# Patient Record
Sex: FEMALE | Race: WHITE
[De-identification: names, ages, dates, MRNs, and addresses within clinical notes are randomized per-mention and may not be internally consistent; named-entity substitution may affect disease eponyms.]

---

## 2018-01-31 ENCOUNTER — HOSPITAL ENCOUNTER (OUTPATIENT)
Dept: HOSPITAL 17 - ESDC | Age: 78
Discharge: HOME | End: 2018-01-31
Attending: OPHTHALMOLOGY
Payer: COMMERCIAL

## 2018-01-31 DIAGNOSIS — H35.372: Primary | ICD-10-CM

## 2018-01-31 PROCEDURE — 00145 ANES PX EYE VITREORTNL SURG: CPT

## 2018-01-31 PROCEDURE — 67042 VIT FOR MACULAR HOLE: CPT

## 2018-02-12 NOTE — TN
cc:

SERGIO WASHINGTON MD

****

 

 

DATE OF SURGERY:  01/31/2018

 

YOB: 1940

 

PREOPERATIVE DIAGNOSIS

Epiretinal membrane, left eye.

 

POSTOPERATIVE DIAGNOSIS

Epiretinal membrane, left eye.

 

PROCEDURE

Pars plana vitrectomy, membrane peeling, left eye.

 

ANESTHESIA

MAC.

 

SURGEON

Dr. Washington.

 

COMPLICATIONS

None.

 

PROCEDURE IN DETAIL

After informed consent was obtained, the patient was brought to the operating

room, placed under brief anesthesia with propofol. 10 cc of 50/50 mixture of

0.75% Marcaine and 2% lidocaine was placed in a modified [place] lid block as

well as peribulbar injection. The patient was then prepared and draped in usual

sterile fashion. A wire lid speculum was placed in the patient's left eye.

23-gauge vitrectomy cannulas were then placed in the lower temporal,

superotemporal and superonasal quadrants 3 mm posterior to the corneoscleral

limbus. Infusion cannula was placed lower temporally. A core vitrectomy was

then performed. The vitrectomy is carried out as far as possible to vitreous

base.

Attention was then turned to the posterior pole where there was an epiretinal

membrane covering the surface of the macula. It was carefully peeled off the

macula with intraocular forceps. The same was then done for the internal

limiting membrane. Careful indirect ophthalmoscopy with scleral depression was

then performed and no peripheral retinal breaks were noted. The three

vitrectomy cannulas were then removed. Subconjunctival injections of

dexamethasone and Ancef were placed. An Atropine drop, Maxitrol ointment and a

patch shield were then applied. The patient tolerated the procedure well. There

were no complications. She will follow up tomorrow in our [place] office.

 

 

 

                              _________________________________

                              Sergio Washington MD

 

 

 

TAB/TLL

D:  2/10/2018/8:39 AM

T:  2/12/2018/9:41 AM

Visit #:  [id number]

Job #:  [id number]